# Patient Record
Sex: MALE | Race: WHITE | NOT HISPANIC OR LATINO
[De-identification: names, ages, dates, MRNs, and addresses within clinical notes are randomized per-mention and may not be internally consistent; named-entity substitution may affect disease eponyms.]

---

## 2018-02-21 PROBLEM — Z00.00 ENCOUNTER FOR PREVENTIVE HEALTH EXAMINATION: Status: ACTIVE | Noted: 2018-02-21

## 2018-02-26 ENCOUNTER — APPOINTMENT (OUTPATIENT)
Dept: ORTHOPEDIC SURGERY | Facility: CLINIC | Age: 58
End: 2018-02-26
Payer: COMMERCIAL

## 2018-02-26 DIAGNOSIS — M17.12 UNILATERAL PRIMARY OSTEOARTHRITIS, LEFT KNEE: ICD-10-CM

## 2018-02-26 DIAGNOSIS — Z60.2 PROBLEMS RELATED TO LIVING ALONE: ICD-10-CM

## 2018-02-26 DIAGNOSIS — M25.562 PAIN IN LEFT KNEE: ICD-10-CM

## 2018-02-26 PROCEDURE — 73560 X-RAY EXAM OF KNEE 1 OR 2: CPT | Mod: RT

## 2018-02-26 PROCEDURE — 73564 X-RAY EXAM KNEE 4 OR MORE: CPT | Mod: LT

## 2018-02-26 PROCEDURE — 99203 OFFICE O/P NEW LOW 30 MIN: CPT

## 2018-02-26 SDOH — SOCIAL STABILITY - SOCIAL INSECURITY: PROBLEMS RELATED TO LIVING ALONE: Z60.2

## 2019-10-02 PROBLEM — Z60.2 PERSON LIVING ALONE: Status: ACTIVE | Noted: 2018-02-26

## 2022-11-03 ENCOUNTER — APPOINTMENT (OUTPATIENT)
Dept: ORTHOPEDIC SURGERY | Facility: CLINIC | Age: 62
End: 2022-11-03

## 2022-11-03 VITALS — BODY MASS INDEX: 28.63 KG/M2 | WEIGHT: 200 LBS | HEIGHT: 70 IN

## 2022-11-03 DIAGNOSIS — M17.12 UNILATERAL PRIMARY OSTEOARTHRITIS, LEFT KNEE: ICD-10-CM

## 2022-11-03 DIAGNOSIS — M17.11 UNILATERAL PRIMARY OSTEOARTHRITIS, RIGHT KNEE: ICD-10-CM

## 2022-11-03 DIAGNOSIS — M10.9 GOUT, UNSPECIFIED: ICD-10-CM

## 2022-11-03 PROCEDURE — 73564 X-RAY EXAM KNEE 4 OR MORE: CPT | Mod: LT

## 2022-11-03 PROCEDURE — 99215 OFFICE O/P EST HI 40 MIN: CPT

## 2022-11-03 NOTE — ASSESSMENT
[FreeTextEntry1] : 62 year M WITH MODERATE LT KNEE PAIN WITHOUT INJURY. HAS TRIED GEL AND CSI IN THE PAST WITH NO RELIEF. PAIN WORSENS WITH STAIRS AND WALKING PROLONGED DISTANCES. PAIN IS AFFECTING ADL AND FUNCTIONAL ACTIVITIES. XRAYS REVIEWED WITH ADVANCED MEDIAL OA. TREATMENT OPTIONS REVIEWED. TKA DISCUSSED AT LENGTH. \par \par PMHx: GOUT\par NO METAL ALLERGIES\par NO H/O DM\par NO H/O DVT/PE\par NO H/O MRSA/VRSA \par \par \par LT TKA - \par \par DISCUSSED R&B OF TKA. WILL ORDER CT TO EVAL FOR BONE LOSS AND DEFORMITY FOR PRE-OP PLANNING. \par \par The patient was advised of the diagnosis. The natural history of the pathology was  explained in full to the patient in layman's terms. All questions were answered. The risks\par and benefits of surgical and non-surgical treatment alternatives were explained in full the patient. \par \par We discussed my findings and the natural history of their condition. We talked about the details of the proposed surgery and the recovery. We discussed the material risks, possible benefits and alternatives to surgery. The risks include but are not limited to infection, bleeding and possible need for blood transfusion, fracture, bowel blockage, bladder retention or infection, need for reoperation, stiffness and/or limited range of motion, possible damage to nerves and blood vessels, failure of fixation of components, risk of deep vein thromboses and pulmonary embolism, wound healing problems, dislocation, and possible leg length discrepancy. Although incredibly rare, we also discussed the risks of a cardiac event, stroke and even death during, or following, the surgery. We discussed the type of implants the patient will be receiving and the type of fixation that will be used, as well as whether a robot or computer navigation aide will be used. The patient understands they will need medical clearance and will attend a preoperative joint education class. We also discussed the type of anesthesia they will receive, and the risks associated with hospital or rehab length of stay, obesity, diabetes and smoking.\par \par Patient Complains of pain in the affected joint with a level that often reaches greater than a 8/10. The Pain has been progressively worsening of his/her treatment coarse. The pain has interfered with their ADLs and worsens with weight bearing. On exam they often have episodes of swelling/effusion with limited ROM. Pain worsens with ROM passive and active and I can palpate crepitus. \par X- rays were reviewed with the patient and they show joint space narrowing, subchondral sclerosis, osteophyte formation, and subchondral cysts.\par After a period of more than 12 weeks physical therapy or exercise program done with me or another treating physician they have continued pain. The patient has failed a trial of NSAID medication or pain relieves if they were unable to tolerate NSAID medications as well as a series of injection, steroid or Hyaluronic Acid. After a long discussion with the patient both the patient and I have decided we have exhausted all forms of less radical treatments and they would like to proceed with Total Joint Replacement.

## 2022-11-03 NOTE — HISTORY OF PRESENT ILLNESS
[Gradual] : gradual [9] : 9 [5] : 5 [Localized] : localized [Constant] : constant [Household chores] : household chores [Nothing helps with pain getting better] : Nothing helps with pain getting better [Stairs] : stairs [de-identified] : 11/03/22 pt presents here today with left knee pain for the past 3 months\par no injury\par pt has try gel and cortisone injections in the past with no relief By Dr Brooke  [] : no [FreeTextEntry1] : left knee  [FreeTextEntry5] : no injury  [de-identified] : pulling back [de-identified] : Dr Lombardi [de-identified] : nothing

## 2022-11-03 NOTE — PHYSICAL EXAM
[Left] : left knee [NL (140)] : flexion 140 degrees [NL (0)] : extension 0 degrees [] : patient ambulates without assistive device

## 2022-11-09 ENCOUNTER — FORM ENCOUNTER (OUTPATIENT)
Age: 62
End: 2022-11-09

## 2022-11-10 ENCOUNTER — APPOINTMENT (OUTPATIENT)
Dept: CT IMAGING | Facility: CLINIC | Age: 62
End: 2022-11-10

## 2022-11-10 PROCEDURE — 73700 CT LOWER EXTREMITY W/O DYE: CPT | Mod: LT

## 2022-11-10 PROCEDURE — 76376 3D RENDER W/INTRP POSTPROCES: CPT | Mod: LT

## 2023-01-09 ENCOUNTER — OUTPATIENT (OUTPATIENT)
Dept: OUTPATIENT SERVICES | Facility: HOSPITAL | Age: 63
LOS: 1 days | End: 2023-01-09
Payer: COMMERCIAL

## 2023-01-09 VITALS
HEIGHT: 70 IN | WEIGHT: 199.08 LBS | OXYGEN SATURATION: 96 % | TEMPERATURE: 97 F | RESPIRATION RATE: 12 BRPM | HEART RATE: 63 BPM | DIASTOLIC BLOOD PRESSURE: 80 MMHG | SYSTOLIC BLOOD PRESSURE: 168 MMHG

## 2023-01-09 DIAGNOSIS — Z01.818 ENCOUNTER FOR OTHER PREPROCEDURAL EXAMINATION: ICD-10-CM

## 2023-01-09 DIAGNOSIS — Z98.890 OTHER SPECIFIED POSTPROCEDURAL STATES: Chronic | ICD-10-CM

## 2023-01-09 DIAGNOSIS — M17.12 UNILATERAL PRIMARY OSTEOARTHRITIS, LEFT KNEE: ICD-10-CM

## 2023-01-09 LAB
A1C WITH ESTIMATED AVERAGE GLUCOSE RESULT: 5.3 % — SIGNIFICANT CHANGE UP (ref 4–5.6)
ALBUMIN SERPL ELPH-MCNC: 4.5 G/DL — SIGNIFICANT CHANGE UP (ref 3.3–5)
ALP SERPL-CCNC: 73 U/L — SIGNIFICANT CHANGE UP (ref 30–120)
ALT FLD-CCNC: 12 U/L DA — SIGNIFICANT CHANGE UP (ref 10–60)
ANION GAP SERPL CALC-SCNC: 6 MMOL/L — SIGNIFICANT CHANGE UP (ref 5–17)
APTT BLD: 33.6 SEC — SIGNIFICANT CHANGE UP (ref 27.5–35.5)
AST SERPL-CCNC: 20 U/L — SIGNIFICANT CHANGE UP (ref 10–40)
BILIRUB SERPL-MCNC: 0.8 MG/DL — SIGNIFICANT CHANGE UP (ref 0.2–1.2)
BLD GP AB SCN SERPL QL: SIGNIFICANT CHANGE UP
BUN SERPL-MCNC: 22 MG/DL — SIGNIFICANT CHANGE UP (ref 7–23)
CALCIUM SERPL-MCNC: 8.8 MG/DL — SIGNIFICANT CHANGE UP (ref 8.4–10.5)
CHLORIDE SERPL-SCNC: 104 MMOL/L — SIGNIFICANT CHANGE UP (ref 96–108)
CO2 SERPL-SCNC: 29 MMOL/L — SIGNIFICANT CHANGE UP (ref 22–31)
CREAT SERPL-MCNC: 1.1 MG/DL — SIGNIFICANT CHANGE UP (ref 0.5–1.3)
EGFR: 75 ML/MIN/1.73M2 — SIGNIFICANT CHANGE UP
ESTIMATED AVERAGE GLUCOSE: 105 MG/DL — SIGNIFICANT CHANGE UP (ref 68–114)
GLUCOSE SERPL-MCNC: 101 MG/DL — HIGH (ref 70–99)
HCT VFR BLD CALC: 40.9 % — SIGNIFICANT CHANGE UP (ref 39–50)
HGB BLD-MCNC: 14 G/DL — SIGNIFICANT CHANGE UP (ref 13–17)
INR BLD: 1.08 RATIO — SIGNIFICANT CHANGE UP (ref 0.88–1.16)
MCHC RBC-ENTMCNC: 29.7 PG — SIGNIFICANT CHANGE UP (ref 27–34)
MCHC RBC-ENTMCNC: 34.2 GM/DL — SIGNIFICANT CHANGE UP (ref 32–36)
MCV RBC AUTO: 86.7 FL — SIGNIFICANT CHANGE UP (ref 80–100)
MRSA PCR RESULT.: SIGNIFICANT CHANGE UP
NRBC # BLD: 0 /100 WBCS — SIGNIFICANT CHANGE UP (ref 0–0)
PLATELET # BLD AUTO: 213 K/UL — SIGNIFICANT CHANGE UP (ref 150–400)
POTASSIUM SERPL-MCNC: 4.2 MMOL/L — SIGNIFICANT CHANGE UP (ref 3.5–5.3)
POTASSIUM SERPL-SCNC: 4.2 MMOL/L — SIGNIFICANT CHANGE UP (ref 3.5–5.3)
PROT SERPL-MCNC: 8.3 G/DL — SIGNIFICANT CHANGE UP (ref 6–8.3)
PROTHROM AB SERPL-ACNC: 12.4 SEC — SIGNIFICANT CHANGE UP (ref 10.5–13.4)
RBC # BLD: 4.72 M/UL — SIGNIFICANT CHANGE UP (ref 4.2–5.8)
RBC # FLD: 12.8 % — SIGNIFICANT CHANGE UP (ref 10.3–14.5)
S AUREUS DNA NOSE QL NAA+PROBE: SIGNIFICANT CHANGE UP
SODIUM SERPL-SCNC: 139 MMOL/L — SIGNIFICANT CHANGE UP (ref 135–145)
WBC # BLD: 5.25 K/UL — SIGNIFICANT CHANGE UP (ref 3.8–10.5)
WBC # FLD AUTO: 5.25 K/UL — SIGNIFICANT CHANGE UP (ref 3.8–10.5)

## 2023-01-09 PROCEDURE — G0463: CPT

## 2023-01-09 PROCEDURE — 93005 ELECTROCARDIOGRAM TRACING: CPT

## 2023-01-09 PROCEDURE — 93010 ELECTROCARDIOGRAM REPORT: CPT

## 2023-01-09 RX ORDER — ALLOPURINOL 300 MG
0 TABLET ORAL
Qty: 0 | Refills: 0 | DISCHARGE

## 2023-01-09 NOTE — H&P PST ADULT - HISTORY OF PRESENT ILLNESS
63 yo male reports left knee pain for last year which is exacerbated by weight bearing and stair climbing rating 9/10 at worst.   He has tried HA and cortisone injections without relief.   He is scheduled for left total knee replacement on 1/27/2023 @ [facility].

## 2023-01-09 NOTE — H&P PST ADULT - BLOOD TRANSFUSION, PREVIOUS, PROFILE
Discharge planning      Discharge order noted for today. Patient has been accepted to ARU. Confirmed with [facility] that bed is available today. Met with patient and son and are agreeable to the transition plan today. Transport to facility has been arranged with staff at 1pm. Patient's discharge summary has been forwarded to ARU via [employer]. Bedside RN, Jax Rodriguez, has been updated to the transition plan. Discharge information has been updated on the AVS.  Please call report to [phone] to room 189.      K. Valene Severance, BSN, RN  Pager # [phone]  Care Manager no

## 2023-01-09 NOTE — H&P PST ADULT - PROBLEM SELECTOR PLAN 1
Left total knee replacement is planned for 1/27/2023  Covid testing scheduled for 1/25/2023 @ [facility]  Diagnostic testing performed  Medical and cardiac clearances requested  Pre op instructions were reviewed including chlorhexadine, gatorade and mupirocin  Best wishes offered Left total knee replacement is planned for 1/27/2023  Covid testing scheduled for 1/25/2023 @ [facility]  Diagnostic testing performed  Medical clearance requested  Pre op instructions were reviewed including chlorhexadine, gatorade and mupirocin  Best wishes offered

## 2023-01-09 NOTE — H&P PST ADULT - NSANTHOSAYNRD_GEN_A_CORE
MAR
No. VALENTINA screening performed.  STOP BANG Legend: 0-2 = LOW Risk; 3-4 = INTERMEDIATE Risk; 5-8 = HIGH Risk

## 2023-01-09 NOTE — H&P PST ADULT - NSICDXPASTMEDICALHX_GEN_ALL_CORE_FT
PAST MEDICAL HISTORY:  COVID-19 vaccine series completed     Osteoarthritis of left knee      PAST MEDICAL HISTORY:  COVID-19 vaccine series completed     Gout     Osteoarthritis of left knee

## 2023-01-09 NOTE — H&P PST ADULT - MUSCULOSKELETAL
details… no joint swelling/no joint erythema/no joint warmth/no calf tenderness/decreased ROM due to pain/extremities exam

## 2023-01-25 ENCOUNTER — OUTPATIENT (OUTPATIENT)
Dept: OUTPATIENT SERVICES | Facility: HOSPITAL | Age: 63
LOS: 1 days | End: 2023-01-25
Payer: COMMERCIAL

## 2023-01-25 DIAGNOSIS — M17.12 UNILATERAL PRIMARY OSTEOARTHRITIS, LEFT KNEE: ICD-10-CM

## 2023-01-25 DIAGNOSIS — Z01.818 ENCOUNTER FOR OTHER PREPROCEDURAL EXAMINATION: ICD-10-CM

## 2023-01-25 DIAGNOSIS — Z20.828 CONTACT WITH AND (SUSPECTED) EXPOSURE TO OTHER VIRAL COMMUNICABLE DISEASES: ICD-10-CM

## 2023-01-25 DIAGNOSIS — Z98.890 OTHER SPECIFIED POSTPROCEDURAL STATES: Chronic | ICD-10-CM

## 2023-01-25 LAB — SARS-COV-2 RNA SPEC QL NAA+PROBE: SIGNIFICANT CHANGE UP

## 2023-01-25 PROCEDURE — U0003: CPT

## 2023-01-25 PROCEDURE — U0005: CPT

## 2023-01-25 RX ORDER — CELECOXIB 200 MG/1
200 CAPSULE ORAL EVERY 12 HOURS
Refills: 0 | Status: DISCONTINUED | OUTPATIENT
Start: 2023-01-27 | End: 2023-02-10

## 2023-01-25 RX ORDER — OXYCODONE HYDROCHLORIDE 5 MG/1
10 TABLET ORAL
Refills: 0 | Status: DISCONTINUED | OUTPATIENT
Start: 2023-01-27 | End: 2023-01-27

## 2023-01-25 RX ORDER — ACETAMINOPHEN 500 MG
1000 TABLET ORAL EVERY 8 HOURS
Refills: 0 | Status: DISCONTINUED | OUTPATIENT
Start: 2023-01-27 | End: 2023-02-10

## 2023-01-25 RX ORDER — MAGNESIUM HYDROXIDE 400 MG/1
30 TABLET, CHEWABLE ORAL DAILY
Refills: 0 | Status: DISCONTINUED | OUTPATIENT
Start: 2023-01-27 | End: 2023-02-10

## 2023-01-25 RX ORDER — PANTOPRAZOLE SODIUM 20 MG/1
40 TABLET, DELAYED RELEASE ORAL
Refills: 0 | Status: DISCONTINUED | OUTPATIENT
Start: 2023-01-27 | End: 2023-02-10

## 2023-01-25 RX ORDER — SODIUM CHLORIDE 9 MG/ML
1000 INJECTION, SOLUTION INTRAVENOUS
Refills: 0 | Status: DISCONTINUED | OUTPATIENT
Start: 2023-01-27 | End: 2023-02-10

## 2023-01-25 RX ORDER — POLYETHYLENE GLYCOL 3350 17 G/17G
17 POWDER, FOR SOLUTION ORAL AT BEDTIME
Refills: 0 | Status: DISCONTINUED | OUTPATIENT
Start: 2023-01-27 | End: 2023-02-10

## 2023-01-25 RX ORDER — ONDANSETRON 8 MG/1
4 TABLET, FILM COATED ORAL EVERY 6 HOURS
Refills: 0 | Status: DISCONTINUED | OUTPATIENT
Start: 2023-01-27 | End: 2023-02-10

## 2023-01-25 RX ORDER — HYDROMORPHONE HYDROCHLORIDE 2 MG/ML
0.5 INJECTION INTRAMUSCULAR; INTRAVENOUS; SUBCUTANEOUS
Refills: 0 | Status: DISCONTINUED | OUTPATIENT
Start: 2023-01-27 | End: 2023-01-27

## 2023-01-25 RX ORDER — SENNA PLUS 8.6 MG/1
2 TABLET ORAL AT BEDTIME
Refills: 0 | Status: DISCONTINUED | OUTPATIENT
Start: 2023-01-27 | End: 2023-02-10

## 2023-01-25 RX ORDER — OXYCODONE HYDROCHLORIDE 5 MG/1
5 TABLET ORAL
Refills: 0 | Status: DISCONTINUED | OUTPATIENT
Start: 2023-01-27 | End: 2023-01-28

## 2023-01-26 ENCOUNTER — TRANSCRIPTION ENCOUNTER (OUTPATIENT)
Age: 63
End: 2023-01-26

## 2023-01-26 NOTE — ASU PATIENT PROFILE, ADULT - NSICDXPASTMEDICALHX_GEN_ALL_CORE_FT
PAST MEDICAL HISTORY:  COVID-19 vaccine series completed     Gout     Osteoarthritis of left knee

## 2023-01-27 ENCOUNTER — OUTPATIENT (OUTPATIENT)
Dept: OUTPATIENT SERVICES | Facility: HOSPITAL | Age: 63
LOS: 1 days | End: 2023-01-27
Payer: COMMERCIAL

## 2023-01-27 ENCOUNTER — TRANSCRIPTION ENCOUNTER (OUTPATIENT)
Age: 63
End: 2023-01-27

## 2023-01-27 ENCOUNTER — RESULT REVIEW (OUTPATIENT)
Age: 63
End: 2023-01-27

## 2023-01-27 ENCOUNTER — APPOINTMENT (OUTPATIENT)
Dept: ORTHOPEDIC SURGERY | Facility: HOSPITAL | Age: 63
End: 2023-01-27
Payer: COMMERCIAL

## 2023-01-27 VITALS
RESPIRATION RATE: 16 BRPM | SYSTOLIC BLOOD PRESSURE: 164 MMHG | HEIGHT: 70 IN | HEART RATE: 67 BPM | DIASTOLIC BLOOD PRESSURE: 94 MMHG | OXYGEN SATURATION: 100 % | WEIGHT: 202.83 LBS | TEMPERATURE: 98 F

## 2023-01-27 DIAGNOSIS — Z98.890 OTHER SPECIFIED POSTPROCEDURAL STATES: Chronic | ICD-10-CM

## 2023-01-27 DIAGNOSIS — Z01.818 ENCOUNTER FOR OTHER PREPROCEDURAL EXAMINATION: ICD-10-CM

## 2023-01-27 DIAGNOSIS — M17.12 UNILATERAL PRIMARY OSTEOARTHRITIS, LEFT KNEE: ICD-10-CM

## 2023-01-27 LAB
ABO RH CONFIRMATION: SIGNIFICANT CHANGE UP
ANION GAP SERPL CALC-SCNC: 9 MMOL/L — SIGNIFICANT CHANGE UP (ref 5–17)
BUN SERPL-MCNC: 19 MG/DL — SIGNIFICANT CHANGE UP (ref 7–23)
CALCIUM SERPL-MCNC: 9 MG/DL — SIGNIFICANT CHANGE UP (ref 8.4–10.5)
CHLORIDE SERPL-SCNC: 103 MMOL/L — SIGNIFICANT CHANGE UP (ref 96–108)
CO2 SERPL-SCNC: 26 MMOL/L — SIGNIFICANT CHANGE UP (ref 22–31)
CREAT SERPL-MCNC: 1.23 MG/DL — SIGNIFICANT CHANGE UP (ref 0.5–1.3)
EGFR: 66 ML/MIN/1.73M2 — SIGNIFICANT CHANGE UP
GLUCOSE SERPL-MCNC: 145 MG/DL — HIGH (ref 70–99)
HCT VFR BLD CALC: 38.3 % — LOW (ref 39–50)
HGB BLD-MCNC: 13.1 G/DL — SIGNIFICANT CHANGE UP (ref 13–17)
MCHC RBC-ENTMCNC: 29.2 PG — SIGNIFICANT CHANGE UP (ref 27–34)
MCHC RBC-ENTMCNC: 34.2 GM/DL — SIGNIFICANT CHANGE UP (ref 32–36)
MCV RBC AUTO: 85.5 FL — SIGNIFICANT CHANGE UP (ref 80–100)
NRBC # BLD: 0 /100 WBCS — SIGNIFICANT CHANGE UP (ref 0–0)
PLATELET # BLD AUTO: 197 K/UL — SIGNIFICANT CHANGE UP (ref 150–400)
POTASSIUM SERPL-MCNC: 4.2 MMOL/L — SIGNIFICANT CHANGE UP (ref 3.5–5.3)
POTASSIUM SERPL-SCNC: 4.2 MMOL/L — SIGNIFICANT CHANGE UP (ref 3.5–5.3)
RBC # BLD: 4.48 M/UL — SIGNIFICANT CHANGE UP (ref 4.2–5.8)
RBC # FLD: 12.6 % — SIGNIFICANT CHANGE UP (ref 10.3–14.5)
SODIUM SERPL-SCNC: 138 MMOL/L — SIGNIFICANT CHANGE UP (ref 135–145)
WBC # BLD: 10.91 K/UL — HIGH (ref 3.8–10.5)
WBC # FLD AUTO: 10.91 K/UL — HIGH (ref 3.8–10.5)

## 2023-01-27 PROCEDURE — 99204 OFFICE O/P NEW MOD 45 MIN: CPT

## 2023-01-27 PROCEDURE — 73560 X-RAY EXAM OF KNEE 1 OR 2: CPT | Mod: 26,LT

## 2023-01-27 PROCEDURE — 27447 TOTAL KNEE ARTHROPLASTY: CPT | Mod: LT

## 2023-01-27 DEVICE — PIN TROCAR GEN 1/8 X 3IN: Type: IMPLANTABLE DEVICE | Site: LEFT | Status: FUNCTIONAL

## 2023-01-27 DEVICE — CEMENT SIMPLEX P 40GM: Type: IMPLANTABLE DEVICE | Site: LEFT | Status: FUNCTIONAL

## 2023-01-27 DEVICE — KIT INSERT TOTAL IDENTITY CR: Type: IMPLANTABLE DEVICE | Site: LEFT | Status: FUNCTIONAL

## 2023-01-27 RX ORDER — CELECOXIB 200 MG/1
1 CAPSULE ORAL
Qty: 56 | Refills: 0
Start: 2023-01-27 | End: 2023-02-23

## 2023-01-27 RX ORDER — ALLOPURINOL 300 MG
200 TABLET ORAL
Qty: 0 | Refills: 0 | DISCHARGE

## 2023-01-27 RX ORDER — TRANEXAMIC ACID 100 MG/ML
1000 INJECTION, SOLUTION INTRAVENOUS ONCE
Refills: 0 | Status: COMPLETED | OUTPATIENT
Start: 2023-01-27 | End: 2023-01-27

## 2023-01-27 RX ORDER — DEXAMETHASONE 0.5 MG/5ML
8 ELIXIR ORAL ONCE
Refills: 0 | Status: COMPLETED | OUTPATIENT
Start: 2023-01-28 | End: 2023-01-28

## 2023-01-27 RX ORDER — CHLORHEXIDINE GLUCONATE 213 G/1000ML
1 SOLUTION TOPICAL ONCE
Refills: 0 | Status: COMPLETED | OUTPATIENT
Start: 2023-01-27 | End: 2023-01-27

## 2023-01-27 RX ORDER — ONDANSETRON 8 MG/1
4 TABLET, FILM COATED ORAL ONCE
Refills: 0 | Status: DISCONTINUED | OUTPATIENT
Start: 2023-01-27 | End: 2023-01-27

## 2023-01-27 RX ORDER — ALLOPURINOL 300 MG
200 TABLET ORAL DAILY
Refills: 0 | Status: DISCONTINUED | OUTPATIENT
Start: 2023-01-27 | End: 2023-01-27

## 2023-01-27 RX ORDER — CEFAZOLIN SODIUM 1 G
2000 VIAL (EA) INJECTION EVERY 8 HOURS
Refills: 0 | Status: COMPLETED | OUTPATIENT
Start: 2023-01-27 | End: 2023-01-28

## 2023-01-27 RX ORDER — ACETAMINOPHEN 500 MG
1000 TABLET ORAL ONCE
Refills: 0 | Status: COMPLETED | OUTPATIENT
Start: 2023-01-27 | End: 2023-01-27

## 2023-01-27 RX ORDER — HYDROMORPHONE HYDROCHLORIDE 2 MG/ML
0.5 INJECTION INTRAMUSCULAR; INTRAVENOUS; SUBCUTANEOUS
Refills: 0 | Status: DISCONTINUED | OUTPATIENT
Start: 2023-01-27 | End: 2023-01-27

## 2023-01-27 RX ORDER — ASPIRIN/CALCIUM CARB/MAGNESIUM 324 MG
1 TABLET ORAL
Qty: 56 | Refills: 0
Start: 2023-01-27 | End: 2023-02-23

## 2023-01-27 RX ORDER — APREPITANT 80 MG/1
40 CAPSULE ORAL ONCE
Refills: 0 | Status: COMPLETED | OUTPATIENT
Start: 2023-01-27 | End: 2023-01-27

## 2023-01-27 RX ORDER — OMEPRAZOLE 10 MG/1
1 CAPSULE, DELAYED RELEASE ORAL
Qty: 28 | Refills: 0
Start: 2023-01-27 | End: 2023-02-23

## 2023-01-27 RX ORDER — SODIUM CHLORIDE 9 MG/ML
1000 INJECTION, SOLUTION INTRAVENOUS
Refills: 0 | Status: DISCONTINUED | OUTPATIENT
Start: 2023-01-27 | End: 2023-01-27

## 2023-01-27 RX ORDER — ACETAMINOPHEN 500 MG
2 TABLET ORAL
Qty: 0 | Refills: 0 | DISCHARGE
Start: 2023-01-27 | End: 2023-02-10

## 2023-01-27 RX ORDER — ALLOPURINOL 300 MG
200 TABLET ORAL DAILY
Refills: 0 | Status: DISCONTINUED | OUTPATIENT
Start: 2023-01-27 | End: 2023-02-10

## 2023-01-27 RX ORDER — ASPIRIN/CALCIUM CARB/MAGNESIUM 324 MG
81 TABLET ORAL EVERY 12 HOURS
Refills: 0 | Status: DISCONTINUED | OUTPATIENT
Start: 2023-01-28 | End: 2023-02-10

## 2023-01-27 RX ORDER — CEFAZOLIN SODIUM 1 G
2000 VIAL (EA) INJECTION ONCE
Refills: 0 | Status: COMPLETED | OUTPATIENT
Start: 2023-01-27 | End: 2023-01-27

## 2023-01-27 RX ORDER — POLYETHYLENE GLYCOL 3350 17 G/17G
17 POWDER, FOR SOLUTION ORAL
Qty: 0 | Refills: 0 | DISCHARGE
Start: 2023-01-27

## 2023-01-27 RX ORDER — SENNA PLUS 8.6 MG/1
2 TABLET ORAL
Qty: 0 | Refills: 0 | DISCHARGE
Start: 2023-01-27

## 2023-01-27 RX ADMIN — Medication 1000 MILLIGRAM(S): at 21:25

## 2023-01-27 RX ADMIN — OXYCODONE HYDROCHLORIDE 5 MILLIGRAM(S): 5 TABLET ORAL at 21:15

## 2023-01-27 RX ADMIN — Medication 1000 MILLIGRAM(S): at 17:07

## 2023-01-27 RX ADMIN — Medication 1000 MILLIGRAM(S): at 21:31

## 2023-01-27 RX ADMIN — CELECOXIB 200 MILLIGRAM(S): 200 CAPSULE ORAL at 21:25

## 2023-01-27 RX ADMIN — CHLORHEXIDINE GLUCONATE 1 APPLICATION(S): 213 SOLUTION TOPICAL at 07:15

## 2023-01-27 RX ADMIN — SODIUM CHLORIDE 75 MILLILITER(S): 9 INJECTION, SOLUTION INTRAVENOUS at 12:32

## 2023-01-27 RX ADMIN — APREPITANT 40 MILLIGRAM(S): 80 CAPSULE ORAL at 07:32

## 2023-01-27 RX ADMIN — OXYCODONE HYDROCHLORIDE 5 MILLIGRAM(S): 5 TABLET ORAL at 20:23

## 2023-01-27 RX ADMIN — CELECOXIB 200 MILLIGRAM(S): 200 CAPSULE ORAL at 21:31

## 2023-01-27 RX ADMIN — Medication 400 MILLIGRAM(S): at 17:07

## 2023-01-27 RX ADMIN — Medication 100 MILLIGRAM(S): at 17:42

## 2023-01-27 NOTE — PHYSICAL THERAPY INITIAL EVALUATION ADULT - GAIT TRAINING, PT EVAL
Patient will ambulate 150 feet independently with a rolling walker within 1-2 days for safe community ambulation.

## 2023-01-27 NOTE — DISCHARGE NOTE NURSING/CASE MANAGEMENT/SOCIAL WORK - PATIENT PORTAL LINK FT
You can access the FollowMyHealth Patient Portal offered by [facility] by registering at the following website: [email or web address]. By joining [facility]’s FollowMyHealth portal, you will also be able to view your health information using other applications (apps) compatible with our system.

## 2023-01-27 NOTE — CARE COORDINATION ASSESSMENT. - NSPASTMEDSURGHISTORY_GEN_ALL_CORE_FT
PAST MEDICAL & SURGICAL HISTORY:  Gout      Osteoarthritis of left knee      COVID-19 vaccine series completed      History of arthroscopy of right shoulder

## 2023-01-27 NOTE — DISCHARGE NOTE PROVIDER - NSDCFUSCHEDAPPT_GEN_ALL_CORE_FT
Mario Jacobo  [facility] Physician [facility]  [address]  Scheduled Appointment: 02/09/2023

## 2023-01-27 NOTE — BRIEF OPERATIVE NOTE - COMMENTS
Patient tolerated the procedure well and was transported to the PACU in stable condition.   XR visualized by surgeon. No FB, No FX, Prosthesis in good alignment with proper placement negative...

## 2023-01-27 NOTE — PATIENT CHOICE NOTE. - NSPTCHOICESTATE_GEN_ALL_CORE

## 2023-01-27 NOTE — DISCHARGE NOTE PROVIDER - HOSPITAL COURSE
This patient was admitted to [facility] with a history of severe degenerative joint disease of the left knee.  Patient underwent Pre-Surgical Testing and was medically cleared to undergo elective procedure. Patient underwent left TKR by Dr. Narendra Jacobo on 1/27/23. Procedure was well tolerated.  No operative or emanuel-operative complications arose during patient's hospital course.  Patient received antibiotic according to SCIP guidelines for infection prevention.  Aspirin 81mg q 12 h was given for DVT prophylaxis, in addition to the use of SCDs.  Anesthesia, Medical Hospitalist, Physical Therapy and Occupational Therapy were consulted. Patient is stable for discharge with a good prognosis.  Appropriate discharge instructions and medications are provided in this document.

## 2023-01-27 NOTE — DISCHARGE NOTE PROVIDER - NSDCQMCOGNITION_NEU_ALL_CORE
After Visit Summary   5/23/2018    Jean Cross    MRN: [id number]           Patient Information     Date Of Birth          1984        Visit Information        Provider Department      5/23/2018 7:00 AM Gadiel Gamez MD [facility]        Today's Diagnoses     Routine history and physical examination of adult    -  1    Essential hypertension with goal blood pressure less than 140/90        CARDIOVASCULAR SCREENING; LDL GOAL LESS THAN 160        Screening for diabetes mellitus        Screening for HIV (human immunodeficiency virus)        Right foot pain          Care Instructions      Preventive Health Recommendations  Male Ages 26 - 39    Yearly exam:             See your health care provider every year in order to  o   Review health changes.   o   Discuss preventive care.    o   Review your medicines if your doctor has prescribed any.    You should be tested each year for STDs (sexually transmitted diseases), if you re at risk.     After age 35, talk to your provider about cholesterol testing. If you are at risk for heart disease, have your cholesterol tested at least every 5 years.     If you are at risk for diabetes, you should have a diabetes test (fasting glucose).  Shots: Get a flu shot each year. Get a tetanus shot every 10 years.     Nutrition:    Eat at least 5 servings of fruits and vegetables daily.     Eat whole-grain bread, whole-wheat pasta and brown rice instead of white grains and rice.     Talk to your provider about Calcium and Vitamin D.     Lifestyle    Exercise for at least 150 minutes a week (30 minutes a day, 5 days a week). This will help you control your weight and prevent disease.     Limit alcohol to one drink per day.     No smoking.     Wear sunscreen to prevent skin cancer.     See your dentist every six months for an exam and cleaning.     At [facility], we strive to deliver an exceptional experience to you, every time we see  you.  If you receive a survey in the mail, please send us back your thoughts. We really do value your feedback.    Based on your medical history, these are the current health maintenance/preventive care services that you are due for (some may have been done at this visit.)  Health Maintenance Due   Topic Date Due     HIV SCREEN (SYSTEM ASSIGNED)  01/05/2001     LIPID MONITORING Q1 YEAR  11/20/2016     ISRAEL QUESTIONNAIRE 1 YEAR  07/15/2017     PHQ-9 Q1YR  07/15/2017     BMP Q1 YR  02/28/2018         Suggested websites for health information:  Www.Simple.org : Up to date and easily searchable information on multiple topics.  Www.AcceloWeb.gov : medication info, interactive tutorials, watch real surgeries online  Www.familydoctor.org : good info from the Academy of Family Physicians  Www.cdc.gov : public health info, travel advisories, epidemics (H1N1)  Www.aap.org : children's health info, normal development, vaccinations  Www.health.[facility].[place].us : [place] dept of health, public health issues in [place], N1N1    Your care team:                            Family Medicine Internal Medicine   MD Duglas Gresham MD Shantel Branch-Fleming, MD Katya Georgiev PA-C Nam Ho, MD Pediatrics   YASMEEN Rojas, KATTY Alex APRN CNP   MD Adelaide Paredes MD Deborah Mielke, MD Kim Thein, APRN CNP      Clinic hours: Monday - Thursday 7 am-7 pm; Fridays 7 am-5 pm.   Urgent care: Monday - Friday 11 am-9 pm; Saturday and Sunday 9 am-5 pm.  Pharmacy : Monday -Thursday 8 am-8 pm; Friday 8 am-6 pm; Saturday and Sunday 9 am-5 pm.     Clinic: [phone]   Pharmacy: [phone]            Follow-ups after your visit        Who to contact     If you have questions or need follow up information about today's clinic visit or your schedule please contact [facility] [address] directly at [phone].  Normal or non-critical lab and imaging results will be communicated to  "you by MyChart, letter or phone within 4 business days after the clinic has received the results. If you do not hear from us within 7 days, please contact the clinic through [employer] or phone. If you have a critical or abnormal lab result, we will notify you by phone as soon as possible.  Submit refill requests through [employer] or call your pharmacy and they will forward the refill request to us. Please allow 3 business days for your refill to be completed.          Additional Information About Your Visit        [facility] Information     [employer] It First gives you secure access to your electronic health record. If you see a primary care provider, you can also send messages to your care team and make appointments. If you have questions, please call your primary care clinic.  If you do not have a primary care provider, please call [phone] and they will assist you.        Care EveryWhere ID     This is your Care EveryWhere ID. This could be used by other organizations to access your [facility] medical records  [id number]        Your Vitals Were     Pulse Temperature Respirations Height Pulse Oximetry BMI (Body Mass Index)    83 98.6  F (37  C) (Oral) 16 5' 10\" (1.778 m) 96% 34.15 kg/m2       Blood Pressure from Last 3 Encounters:   05/23/18 122/81   06/19/17 126/80   03/30/17 125/79    Weight from Last 3 Encounters:   05/23/18 238 lb (108 kg)   06/19/17 238 lb 14.4 oz (108.4 kg)   03/30/17 242 lb 1.6 oz (109.8 kg)              We Performed the Following     Albumin Random Urine Quantitative with Creat Ratio     Comprehensive metabolic panel (BMP + Alb, Alk Phos, ALT, AST, Total. Bili, TP)     HIV Screening     Lipid panel reflex to direct LDL Fasting          Today's Medication Changes          These changes are accurate as of 5/23/18  7:53 AM.  If you have any questions, ask your nurse or doctor.               Start taking these medicines.        Dose/Directions    losartan 25 MG tablet   Commonly known as:  COZAAR   Used " for:  Essential hypertension with goal blood pressure less than 140/90   Started by:  Gadiel Gamez MD        Dose:  25 mg   Take 1 tablet (25 mg) by mouth daily For blood pressure.   Quantity:  30 tablet   Refills:  1         Stop taking these medicines if you haven't already. Please contact your care team if you have questions.     lisinopril 10 MG tablet   Commonly known as:  PRINIVIL/ZESTRIL   Stopped by:  Gadiel Gamez MD                Where to get your medicines      These medications were sent to [facility] [address] [place] [id number]     Phone:  [phone]     losartan 25 MG tablet                Primary Care Provider Office Phone # Fax #    Gadiel Gamez [phone]       [address] [place]  [facility] [id number]        Equal Access to Services     [facility]: Hadii aad ku hadasho Soomaali, waaxda luqadaha, qaybta kaalmada adeegyada, naif casillasin hayaadarlene mckeon . So [facility] [phone].    ATENCIÓN: Si habla español, tiene a ny disposición servicios gratuitos de asistencia lingüística. Llame al [phone].    We comply with applicable federal civil rights laws and [place] laws. We do not discriminate on the basis of race, color, national origin, age, disability, sex, sexual orientation, or gender identity.            Thank you!     Thank you for choosing [facility]  for your care. Our goal is always to provide you with excellent care. Hearing back from our patients is one way we can continue to improve our services. Please take a few minutes to complete the written survey that you may receive in the mail after your visit with us. Thank you!             Your Updated Medication List - Protect others around you: Learn how to safely use, store and throw away your medicines at www.disposemymeds.org.          This list is accurate as of 5/23/18  7:53 AM.  Always use your most recent med list.                    Brand Name Dispense Instructions for use Diagnosis    losartan 25 MG tablet    COZAAR    30 tablet    Take 1 tablet (25 mg) by mouth daily For blood pressure.    Essential hypertension with goal blood pressure less than 140/90          No difficulties

## 2023-01-27 NOTE — CONSULT NOTE ADULT - ASSESSMENT
61 y/o f with left knee pain and OA , gout here for left knee pain    post op care left tkr  - pain ocntrol as per primary team  - tylenol prn for fever  - AC as per primary  - incentive spirometry  - pt/ot eval    gout  - home meds    dvt/gi ppx  please obtain base line labs

## 2023-01-27 NOTE — PHYSICAL THERAPY INITIAL EVALUATION ADULT - ADDITIONAL COMMENTS
Pt lives with girlfriend in an apartment that has elevator and ramp access. Pt has a tub shower. PTA pt was independent with ADLs and ambulation. Pt has a shower bench, RW, raised toilet seat and crutches.

## 2023-01-27 NOTE — DISCHARGE NOTE PROVIDER - NSDCMRMEDTOKEN_GEN_ALL_CORE_FT
acetaminophen 500 mg oral tablet: 2 tab(s) orally every 8 hours  allopurinol: 200 milligram(s) orally once a day  Aspirin Enteric Coated 81 mg oral delayed release tablet: 1 tab orally every 12 hours. Take aspirin 2 hours before Celebrex/Meloxicam.  CeleBREX 200 mg oral capsule: 1 cap orally every 12 hours. Take 2 hours after aspirin.  omeprazole 20 mg oral delayed release capsule: 1 cap(s) orally once a day    acetaminophen 500 mg oral tablet: 2 tab(s) orally every 8 hours  allopurinol: 200 milligram(s) orally once a day  Aspirin Enteric Coated 81 mg oral delayed release tablet: 1 tab orally every 12 hours. Take aspirin 2 hours before Celebrex/Meloxicam.  CeleBREX 200 mg oral capsule: 1 cap orally every 12 hours. Take 2 hours after aspirin.  omeprazole 20 mg oral delayed release capsule: 1 cap(s) orally once a day   oxyCODONE 5 mg oral tablet: 1-2 tab(s) orally every 4 hours, As Needed -Moderate to severe Pain MDD:6   [id number]  polyethylene glycol 3350 oral powder for reconstitution: 17 gram(s) orally once a day (at bedtime)  senna leaf extract oral tablet: 2 tab(s) orally once a day (at bedtime)

## 2023-01-27 NOTE — PHARMACOTHERAPY INTERVENTION NOTE - COMMENTS
Admission medication reconciliation POD0  
Transition of Care video discharge education - medication calendar given to patient

## 2023-01-27 NOTE — CONSULT NOTE ADULT - SUBJECTIVE AND OBJECTIVE BOX
Patient is a 62y old  Male who presents with a chief complaint of     HPI:      PAST MEDICAL & SURGICAL HISTORY:  COVID-19 vaccine series completed      Osteoarthritis of left knee      Gout      History of arthroscopy of right shoulder          FAMILY HISTORY:  No pertinent family history in first degree relatives        SOCIAL HISTORY:    Allergies    No Known Allergies    Intolerances          REVIEW OF SYSTEMS:  General: NAD  Skin/Breast: no rash  Ophthalmologic: no vision changes, no dry eyes   Respiratory and Thorax: no cough  Cardiovascular: no chest pain  Gastrointestinal: no n/v/d  : no urigency, no increased frequency  Musculoskeletal: no trauma, no sprain/strain, no myalgias, no arthralgias, no fracture  Neurological: no HA, no dizziness, no weakness, no numbness  Psychiatric: no depression, no SI/HI  Hematology/Lymphatics: no easy bruising  Endocrine: no heat or cold intolerance. no weight gain or loss  Allergic/Immunologic: no allergy or recent reaction       Vital Signs Last 24 Hrs  T(C): 36 (27 Jan 2023 10:55), Max: 36.7 (27 Jan 2023 07:06)  T(F): 96.8 (27 Jan 2023 10:55), Max: 98.1 (27 Jan 2023 07:06)  HR: 57 (27 Jan 2023 12:50) (51 - 67)  BP: 134/85 (27 Jan 2023 12:25) (110/69 - 164/94)  BP(mean): --  RR: 17 (27 Jan 2023 12:50) (13 - 17)  SpO2: 98% (27 Jan 2023 12:50) (98% - 100%)    Parameters below as of 27 Jan 2023 11:55  Patient On (Oxygen Delivery Method): nasal cannula  O2 Flow (L/min): 2    I&O's Summary      PHYSICAL EXAM:  GENERAL: NAD, Comfortable  HEAD:  Atraumatic, Normocephalic  NECK: Supple, No JVD  CHEST/LUNG: Clear to auscultation bilaterally; No wheeze  HEART: Regular rate and rhythm  ABDOMEN: Soft, Nontender, Nondistended; Bowel sounds present  Neuro: no focal deficit  EXTREMITIES:  no edema  SKIN: No rashes or lesions    LABS:            CAPILLARY BLOOD GLUCOSE                RADIOLOGY & ADDITIONAL TESTS:    Imaging Personally Reviewed:  [x] YES  [ ] NO    Consultant(s) Notes Reviewed:  [x] YES  [ ] NO      MEDICATIONS  (STANDING):  lactated ringers. 1000 milliLiter(s) (75 mL/Hr) IV Continuous <Continuous>    MEDICATIONS  (PRN):  HYDROmorphone  Injectable 0.5 milliGRAM(s) IV Push every 10 minutes PRN Moderate Pain (4 - 6)  ondansetron Injectable 4 milliGRAM(s) IV Push once PRN Nausea and/or Vomiting      Care Discussed with Consultants/Other Providers [x] YES  [ ] NO    HEALTH ISSUES - PROBLEM Dx:       Patient is a 62y old  Male who presents with a chief complaint of left knee pain and OA    HPI: 63 y/o f with left knee pain and OA , gout here for left knee pain and is s/p l tkr. no fever, np shob      PAST MEDICAL & SURGICAL HISTORY:  COVID-19 vaccine series completed      Osteoarthritis of left knee      Gout      History of arthroscopy of right shoulder          FAMILY HISTORY:  No pertinent family history in first degree relatives        SOCIAL HISTORY:    Allergies    No Known Allergies    Intolerances          REVIEW OF SYSTEMS:  General: NAD  Skin/Breast: no rash  Ophthalmologic: no vision changes, no dry eyes   Respiratory and Thorax: no cough  Cardiovascular: no chest pain  Gastrointestinal: no n/v/d  : no urigency, no increased frequency  Musculoskeletal: no trauma, no sprain/strain, no myalgias, no arthralgias, no fracture  Neurological: no HA, no dizziness, no weakness, no numbness  Psychiatric: no depression, no SI/HI  Hematology/Lymphatics: no easy bruising  Endocrine: no heat or cold intolerance. no weight gain or loss  Allergic/Immunologic: no allergy or recent reaction       Vital Signs Last 24 Hrs  T(C): 36 (27 Jan 2023 10:55), Max: 36.7 (27 Jan 2023 07:06)  T(F): 96.8 (27 Jan 2023 10:55), Max: 98.1 (27 Jan 2023 07:06)  HR: 57 (27 Jan 2023 12:50) (51 - 67)  BP: 134/85 (27 Jan 2023 12:25) (110/69 - 164/94)  BP(mean): --  RR: 17 (27 Jan 2023 12:50) (13 - 17)  SpO2: 98% (27 Jan 2023 12:50) (98% - 100%)    Parameters below as of 27 Jan 2023 11:55  Patient On (Oxygen Delivery Method): nasal cannula  O2 Flow (L/min): 2    I&O's Summary      PHYSICAL EXAM:  GENERAL: NAD, Comfortable  HEAD:  Atraumatic, Normocephalic  NECK: Supple, No JVD  CHEST/LUNG: Clear to auscultation bilaterally; No wheeze  HEART: Regular rate and rhythm  ABDOMEN: Soft, Nontender, Nondistended; Bowel sounds present  Neuro: no focal deficit  EXTREMITIES:  no edema  SKIN: No rashes or lesions    LABS:            CAPILLARY BLOOD GLUCOSE                RADIOLOGY & ADDITIONAL TESTS:    Imaging Personally Reviewed:  [x] YES  [ ] NO    Consultant(s) Notes Reviewed:  [x] YES  [ ] NO      MEDICATIONS  (STANDING):  lactated ringers. 1000 milliLiter(s) (75 mL/Hr) IV Continuous <Continuous>    MEDICATIONS  (PRN):  HYDROmorphone  Injectable 0.5 milliGRAM(s) IV Push every 10 minutes PRN Moderate Pain (4 - 6)  ondansetron Injectable 4 milliGRAM(s) IV Push once PRN Nausea and/or Vomiting      Care Discussed with Consultants/Other Providers [x] YES  [ ] NO    HEALTH ISSUES - PROBLEM Dx:

## 2023-01-27 NOTE — DISCHARGE NOTE NURSING/CASE MANAGEMENT/SOCIAL WORK - NSSCNAMETXT_GEN_ALL_CORE
[facility] (formerly [facility])   [address] [place] [address]

## 2023-01-27 NOTE — DISCHARGE NOTE PROVIDER - NSDCCPCAREPLAN_GEN_ALL_CORE_FT
PRINCIPAL DISCHARGE DIAGNOSIS  Diagnosis: Primary osteoarthritis of left knee  Assessment and Plan of Treatment: For your total knee replacement:  Physical Therapy/Occupational Therapy for: ambulation, transfers, stairs, ADL's (activities of daily living), range of motion exercises, and isometrics  -Activity  • Weight Bearing as tolerated with rolling walker.  • Cryo/cuff 20 minutes several times daily with at least a 1 hour break in between icing sessions  • Take short, frequent walks increasing the distance that you walk each day as tolerated.  • Change your position every hour to decrease pain and stiffness.  • Continue the exercises taught to you by your physical therapist.  • No driving until cleared by the doctor.  • No tub baths, hot tubs, or swimming pools until instructed by your doctor.  • Do not squat down on the floor.  • Do not kneel or twist your knee.  • Range of Motion Goals: Flexion= 120 degrees, Extension = 0 degrees  You have a RADHA dressing. You may shower. Disconnect RADHA battery prior to showering. Reconnect battery after showering and press orange button to resume RADHA power. Remove RADHA dressing on post-op day #7.  Keep incision clean. DO NOT APPLY ANYTHING to incision site (salves/ointments/creams). Do not scrub incision site. Pat dry after shower.         PRINCIPAL DISCHARGE DIAGNOSIS  Diagnosis: Primary osteoarthritis of left knee  Assessment and Plan of Treatment: For your total knee replacement:  Physical Therapy/Occupational Therapy for: ambulation, transfers, stairs, ADL's (activities of daily living), range of motion exercises, and isometrics  -Activity  • Weight Bearing as tolerated with rolling walker.  • Take short, frequent walks increasing the distance that you walk each day as tolerated.  • Change your position every hour to decrease pain and stiffness.  • Continue the exercises taught to you by your physical therapist.  • No driving until cleared by the doctor.  • No tub baths, hot tubs, or swimming pools until instructed by your doctor.  • Do not squat down on the floor.  • Do not kneel or twist your knee.  • Range of Motion Goals: Flexion= 120 degrees, Extension = 0 degrees  You have a RADHA dressing. You may shower. Disconnect RADHA battery prior to showering. Reconnect battery after showering and press orange button to resume RADHA power. Remove RADHA dressing on post-op day #7.  Keep incision clean. DO NOT APPLY ANYTHING to incision site (salves/ointments/creams). Do not scrub incision site. Pat dry after shower.  Use cryocuff for 20 minute sessions w/ at least 1 hr between sessions.  Use a towel or washcloth under cuff; don't place directly on the skin.  Opioid safety : Do not keep opioid in the medicine cabinet in bathroom where others may have access to it.  Do not drive while taking opioid.  To dispose of it some pharmacies do have drop boxes as do police stations.  Do not flush or put in trash.

## 2023-01-27 NOTE — DISCHARGE NOTE PROVIDER - CARE PROVIDER_API CALL
Mario Jacobo)  Orthopaedic Surgery  [address] [place] [address]  Phone: [phone]  Fax: [phone]  Established Patient  Scheduled Appointment: 02/09/2023 10:00 AM

## 2023-01-27 NOTE — CARE COORDINATION ASSESSMENT. - NSDCPLANSERVICES_GEN_ALL_CORE
significant other Jennifer will provide transport home  [facility] - SOC 01/29/2023/Home Care/Transportation

## 2023-01-27 NOTE — DISCHARGE NOTE NURSING/CASE MANAGEMENT/SOCIAL WORK - NSDCPEFALRISK_GEN_ALL_CORE
For information on Fall & Injury Prevention, visit: [email or web address] OR  [email or web address] OR  https://www.cdc.gov/steadi/patient.html

## 2023-01-27 NOTE — DISCHARGE NOTE PROVIDER - PROVIDER TOKENS
PROVIDER:[TOKEN:[[id number]],SCHEDULEDAPPT:[02/09/2023],SCHEDULEDAPPTTIME:[10:00 AM],ESTABLISHEDPATIENT:[T]]

## 2023-01-27 NOTE — CARE COORDINATION ASSESSMENT. - CURRENT MENTAL STATUS/COGNITIVE FUNCTIONING
alert/oriented to person/oriented to place/oriented to time/oriented to situation/recall memory is intact

## 2023-01-27 NOTE — CARE COORDINATION ASSESSMENT. - NSCAREPROVIDERS_GEN_ALL_CORE_FT
CARE PROVIDERS:  Administration: Ching Eagle  Admitting: Mario Jacobo  Attending: Mario Jacobo  Case Management: Melida Higginbotham  Consultant: Ector Martin  Consultant: Michael Wisdom  Nurse: Kitty Hammer  Nurse: Annie Mclain  Nurse: Gi Musa  Nurse: Birdie Mayorga  Nurse: Radha Nassar  Nurse: Yanely Hua  Nurse: Violeta Antoine  Nurse: Lorrie Goff  Occupational Therapy: Noemí Flores  Override: Gi Musa  PCA/Nursing Assistant: Dalia Hicks  Physical Therapy: Janey Coates  Primary Team: Renetta Chairez  Primary Team: Nathan Pleitez  Primary Team: Annie Hopkins  Primary Team: Sarath Avila  Primary Team: Lalo Mc  Primary Team: Fito Hines  Research: Danie Nielsen  Respiratory Therapy: Kyra Harris  Respiratory Therapy: Laura Bourne  Team: RODRIGO  Hospitalists, Team  UR// Supp. Assoc.: Hailey De Souza

## 2023-01-27 NOTE — DISCHARGE NOTE PROVIDER - NSDCCPGOAL_GEN_ALL_CORE_FT
Subjective   Han Garcia is a 59 y.o. male.     History of Present Illness   Patient presents to office for recheck of blood pressure. At last visit, medication was changed from irbesartan to lisinopril/hctz.  Patient has been taking medication as prescribed. Reports feeling well and denies medication side effects.  BP today is 130/78.    The following portions of the patient's history were reviewed and updated as appropriate: allergies, current medications, past family history, past medical history, past social history, past surgical history and problem list.    Review of Systems   Constitutional: Negative for fatigue.   Respiratory: Negative for cough and shortness of breath.    Cardiovascular: Negative for chest pain and palpitations.   Skin: Negative for rash.   Psychiatric/Behavioral: Negative for dysphoric mood and sleep disturbance. The patient is not nervous/anxious.        Objective   Physical Exam   Constitutional: He is oriented to person, place, and time. He appears well-developed and well-nourished.   Cardiovascular: Normal rate and regular rhythm.    Pulmonary/Chest: Effort normal and breath sounds normal.   Neurological: He is oriented to person, place, and time.   Skin: Skin is warm and dry.   Psychiatric: He has a normal mood and affect. His behavior is normal. Judgment and thought content normal.   Nursing note and vitals reviewed.      Assessment/Plan   Han was seen today for hypertension.    Diagnoses and all orders for this visit:    Essential hypertension  -     lisinopril-hydrochlorothiazide (ZESTORETIC) 20-12.5 MG per tablet; Take 1 tablet by mouth Daily.                To get better and follow your care plan as instructed. Improve ambulation, ADLs and quality of life.

## 2023-01-28 ENCOUNTER — RESULT REVIEW (OUTPATIENT)
Age: 63
End: 2023-01-28

## 2023-01-28 VITALS
SYSTOLIC BLOOD PRESSURE: 139 MMHG | DIASTOLIC BLOOD PRESSURE: 85 MMHG | OXYGEN SATURATION: 98 % | HEART RATE: 65 BPM | RESPIRATION RATE: 18 BRPM | TEMPERATURE: 98 F

## 2023-01-28 LAB
ANION GAP SERPL CALC-SCNC: 9 MMOL/L — SIGNIFICANT CHANGE UP (ref 5–17)
BUN SERPL-MCNC: 17 MG/DL — SIGNIFICANT CHANGE UP (ref 7–23)
CALCIUM SERPL-MCNC: 8.4 MG/DL — SIGNIFICANT CHANGE UP (ref 8.4–10.5)
CHLORIDE SERPL-SCNC: 106 MMOL/L — SIGNIFICANT CHANGE UP (ref 96–108)
CO2 SERPL-SCNC: 28 MMOL/L — SIGNIFICANT CHANGE UP (ref 22–31)
CREAT SERPL-MCNC: 1.11 MG/DL — SIGNIFICANT CHANGE UP (ref 0.5–1.3)
EGFR: 75 ML/MIN/1.73M2 — SIGNIFICANT CHANGE UP
GLUCOSE SERPL-MCNC: 110 MG/DL — HIGH (ref 70–99)
HCT VFR BLD CALC: 32 % — LOW (ref 39–50)
HGB BLD-MCNC: 11.1 G/DL — LOW (ref 13–17)
MCHC RBC-ENTMCNC: 29.8 PG — SIGNIFICANT CHANGE UP (ref 27–34)
MCHC RBC-ENTMCNC: 34.7 GM/DL — SIGNIFICANT CHANGE UP (ref 32–36)
MCV RBC AUTO: 85.8 FL — SIGNIFICANT CHANGE UP (ref 80–100)
NRBC # BLD: 0 /100 WBCS — SIGNIFICANT CHANGE UP (ref 0–0)
PLATELET # BLD AUTO: 167 K/UL — SIGNIFICANT CHANGE UP (ref 150–400)
POTASSIUM SERPL-MCNC: 4.2 MMOL/L — SIGNIFICANT CHANGE UP (ref 3.5–5.3)
POTASSIUM SERPL-SCNC: 4.2 MMOL/L — SIGNIFICANT CHANGE UP (ref 3.5–5.3)
RBC # BLD: 3.73 M/UL — LOW (ref 4.2–5.8)
RBC # FLD: 12.4 % — SIGNIFICANT CHANGE UP (ref 10.3–14.5)
SODIUM SERPL-SCNC: 143 MMOL/L — SIGNIFICANT CHANGE UP (ref 135–145)
WBC # BLD: 8.9 K/UL — SIGNIFICANT CHANGE UP (ref 3.8–10.5)
WBC # FLD AUTO: 8.9 K/UL — SIGNIFICANT CHANGE UP (ref 3.8–10.5)

## 2023-01-28 PROCEDURE — C1776: CPT

## 2023-01-28 PROCEDURE — 97530 THERAPEUTIC ACTIVITIES: CPT

## 2023-01-28 PROCEDURE — C1713: CPT

## 2023-01-28 PROCEDURE — 73560 X-RAY EXAM OF KNEE 1 OR 2: CPT

## 2023-01-28 PROCEDURE — 97161 PT EVAL LOW COMPLEX 20 MIN: CPT

## 2023-01-28 PROCEDURE — 97110 THERAPEUTIC EXERCISES: CPT

## 2023-01-28 PROCEDURE — 97535 SELF CARE MNGMENT TRAINING: CPT

## 2023-01-28 PROCEDURE — 85027 COMPLETE CBC AUTOMATED: CPT

## 2023-01-28 PROCEDURE — 93971 EXTREMITY STUDY: CPT | Mod: 26,LT

## 2023-01-28 PROCEDURE — 97165 OT EVAL LOW COMPLEX 30 MIN: CPT

## 2023-01-28 PROCEDURE — 80048 BASIC METABOLIC PNL TOTAL CA: CPT

## 2023-01-28 PROCEDURE — 93971 EXTREMITY STUDY: CPT

## 2023-01-28 PROCEDURE — 27447 TOTAL KNEE ARTHROPLASTY: CPT | Mod: LT

## 2023-01-28 PROCEDURE — 97116 GAIT TRAINING THERAPY: CPT

## 2023-01-28 PROCEDURE — 36415 COLL VENOUS BLD VENIPUNCTURE: CPT

## 2023-01-28 RX ORDER — OXYCODONE HYDROCHLORIDE 5 MG/1
1 TABLET ORAL
Qty: 42 | Refills: 0
Start: 2023-01-28 | End: 2023-02-03

## 2023-01-28 RX ADMIN — SODIUM CHLORIDE 125 MILLILITER(S): 9 INJECTION, SOLUTION INTRAVENOUS at 00:42

## 2023-01-28 RX ADMIN — Medication 1000 MILLIGRAM(S): at 05:29

## 2023-01-28 RX ADMIN — CELECOXIB 200 MILLIGRAM(S): 200 CAPSULE ORAL at 08:55

## 2023-01-28 RX ADMIN — Medication 1000 MILLIGRAM(S): at 14:47

## 2023-01-28 RX ADMIN — Medication 1000 MILLIGRAM(S): at 15:19

## 2023-01-28 RX ADMIN — OXYCODONE HYDROCHLORIDE 5 MILLIGRAM(S): 5 TABLET ORAL at 05:31

## 2023-01-28 RX ADMIN — Medication 1000 MILLIGRAM(S): at 05:33

## 2023-01-28 RX ADMIN — Medication 200 MILLIGRAM(S): at 11:40

## 2023-01-28 RX ADMIN — SODIUM CHLORIDE 125 MILLILITER(S): 9 INJECTION, SOLUTION INTRAVENOUS at 04:17

## 2023-01-28 RX ADMIN — CELECOXIB 200 MILLIGRAM(S): 200 CAPSULE ORAL at 09:04

## 2023-01-28 RX ADMIN — OXYCODONE HYDROCHLORIDE 5 MILLIGRAM(S): 5 TABLET ORAL at 12:40

## 2023-01-28 RX ADMIN — PANTOPRAZOLE SODIUM 40 MILLIGRAM(S): 20 TABLET, DELAYED RELEASE ORAL at 05:31

## 2023-01-28 RX ADMIN — OXYCODONE HYDROCHLORIDE 5 MILLIGRAM(S): 5 TABLET ORAL at 11:40

## 2023-01-28 RX ADMIN — Medication 100 MILLIGRAM(S): at 00:42

## 2023-01-28 RX ADMIN — Medication 81 MILLIGRAM(S): at 05:29

## 2023-01-28 RX ADMIN — Medication 101.6 MILLIGRAM(S): at 05:28

## 2023-01-28 RX ADMIN — OXYCODONE HYDROCHLORIDE 5 MILLIGRAM(S): 5 TABLET ORAL at 06:29

## 2023-01-28 NOTE — PROGRESS NOTE ADULT - SUBJECTIVE AND OBJECTIVE BOX
Ortho PA Addendum    LEft LE Venous Doppler perform for LEft calf pain and equical tenderness on exam earlier is Neg for DVT. Read by Radiology.  Plan: Cont,. with home D/C plan.
Orthopaedic Post Op Note    Procedure: Left TKR  Surgeon: Narendra Jacobo     62y Male comfortable, without complaints. Up to side of bed with PT. Tolerating diet. +Voiding.  Reported pain score = 3  Denies N/V, CP, SOB, numbness/tingling of extremities.    PE:  Vital Signs Last 24 Hrs  T(C): 36.6 (27 Jan 2023 13:20), Max: 36.7 (27 Jan 2023 07:06)  T(F): 97.9 (27 Jan 2023 13:20), Max: 98.1 (27 Jan 2023 07:06)  HR: 61 (27 Jan 2023 13:20) (51 - 67)  BP: 114/77 (27 Jan 2023 13:20) (110/69 - 164/94)  RR: 19 (27 Jan 2023 13:20) (13 - 19)  SpO2: 98% (27 Jan 2023 13:20) (98% - 100%)    Parameters below as of 27 Jan 2023 13:20  Patient On (Oxygen Delivery Method): room air      General: Pt alert and oriented   Left Knee Dressing: C/D/I, Cryo/cuff in place, RADHA battery on   Bilateral LEs:  Motor:   5/5 dorsiflexion, plantarflexion, EHL  Sensation intact to LT  2+ DP Pulses  SCDs in place      A/P: 62y Male POD#0 s/p Left TKR  - Stable  - Acetaminophen, Celebrex, Oxycodone, Dilaudid for Pain Control   - DVT ppx: Aspirin 81mg q 12 h   - [place] op IV abx: Ancef  - PT, OT per protocol  - F/U AM Labs  - Instructed RADHA use/care/removal  DCP = home tomorrow pending PT, OT, medical clearance       
Discharge medication calendar:  ASA EC 81mg q12h x 4 weeks  Omeprazole 20mg QAM x 4 weeks  APAP 1000mg q8h x 2-3 weeks  Narcotic PRN  Docusate 100mg TID while taking narcotic  Miralax, Senna, or Bisacodyl PRN for treatment of constipation  Celecoxib 200mg q12h x 2-3 weeks  
Patient is a 62y old  Male who presents with a chief complaint of left knee pain--for left TKA (28 Jan 2023 10:05)      SUBJECTIVE / OVERNIGHT EVENTS: pt seen and examined. no fever, no shob, NAD, no c/o pain        Vital Signs Last 24 Hrs  T(C): 36.7 (28 Jan 2023 07:55), Max: 36.7 (27 Jan 2023 13:40)  T(F): 98.1 (28 Jan 2023 07:55), Max: 98.1 (27 Jan 2023 13:40)  HR: 65 (28 Jan 2023 07:55) (51 - 67)  BP: 139/87 (28 Jan 2023 07:55) (110/69 - 145/88)  BP(mean): --  RR: 20 (28 Jan 2023 07:55) (13 - 20)  SpO2: 97% (28 Jan 2023 07:55) (97% - 100%)    Parameters below as of 28 Jan 2023 07:55  Patient On (Oxygen Delivery Method): room air      I&O's Summary    27 Jan 2023 07:01  -  28 Jan 2023 07:00  --------------------------------------------------------  IN: 1340 mL / OUT: 1055 mL / NET: 285 mL        PHYSICAL EXAM:  GENERAL: NAD  HEAD:  Atraumatic, Normocephalic  NECK: Supple  CHEST/LUNG: CTABL  HEART: S1+S2  ABDOMEN: Soft, Nontender, Nondistended; Bowel sounds present  Neuro: no focal deficit  EXTREMITIES:  No edema  SKIN: No rashes or lesions    LABS:                        11.1   8.90  )-----------( 167      ( 28 Jan 2023 06:46 )             32.0     01-28    143  |  106  |  17  ----------------------------<  110<H>  4.2   |  28  |  1.11    Ca    8.4      28 Jan 2023 06:46        CAPILLARY BLOOD GLUCOSE                RADIOLOGY & ADDITIONAL TESTS:    Imaging Personally Reviewed:  [x] YES  [ ] NO    Consultant(s) Notes Reviewed:  [x] YES  [ ] NO      MEDICATIONS  (STANDING):  acetaminophen     Tablet .. 1000 milliGRAM(s) Oral every 8 hours  allopurinol 200 milliGRAM(s) Oral daily  aspirin enteric coated 81 milliGRAM(s) Oral every 12 hours  celecoxib 200 milliGRAM(s) Oral every 12 hours  lactated ringers. 1000 milliLiter(s) (125 mL/Hr) IV Continuous <Continuous>  pantoprazole    Tablet 40 milliGRAM(s) Oral before breakfast  polyethylene glycol 3350 17 Gram(s) Oral at bedtime  senna 2 Tablet(s) Oral at bedtime    MEDICATIONS  (PRN):  HYDROmorphone  Injectable 0.5 milliGRAM(s) IV Push every 3 hours PRN breakthrough pain  magnesium hydroxide Suspension 30 milliLiter(s) Oral daily PRN Constipation  ondansetron Injectable 4 milliGRAM(s) IV Push every 6 hours PRN Nausea and/or Vomiting  oxyCODONE    IR 5 milliGRAM(s) Oral every 3 hours PRN Moderate Pain (4 - 6)  oxyCODONE    IR 10 milliGRAM(s) Oral every 3 hours PRN Severe Pain (7 - 10)      Care Discussed with Consultants/Other Providers [x] YES  [ ] NO    HEALTH ISSUES - PROBLEM Dx:      
Procedure: Left TKR  POD #: 1  S: Pt without complaints. No SOB,CP, N/V. Tolerated Diet well.   Pain comfortable on tylenol and celebrex.  oxy 5 used twice  No BM yet, + flatus, No abdominal pain.  Pain Rx:  acetaminophen     Tablet .. 1000 milliGRAM(s) Oral every 8 hours  celecoxib 200 milliGRAM(s) Oral every 12 hours  HYDROmorphone  Injectable 0.5 milliGRAM(s) IV Push every 3 hours PRN  ondansetron Injectable 4 milliGRAM(s) IV Push every 6 hours PRN  oxyCODONE    IR 5 milliGRAM(s) Oral every 3 hours PRN  oxyCODONE    IR 10 milliGRAM(s) Oral every 3 hours PRN    O: General: On exam, No Apparent Distress  Vital Signs Last 24 Hrs  T(C): 36.7 (28 Jan 2023 07:55), Max: 36.7 (27 Jan 2023 13:40)  T(F): 98.1 (28 Jan 2023 07:55), Max: 98.1 (27 Jan 2023 13:40)  HR: 65 (28 Jan 2023 07:55) (51 - 67)  BP: 139/87 (28 Jan 2023 07:55) (110/69 - 145/88)  BP(mean): --  RR: 20 (28 Jan 2023 07:55) (13 - 20)  SpO2: 97% (28 Jan 2023 07:55) (97% - 100%)    Parameters below as of 28 Jan 2023 07:55  Patient On (Oxygen Delivery Method): room air        Lungs: BS clear bilat.  Heart: RRR no murmur  Abdomen: + BS, soft , benign exam     Ext -- left knee sarahi intact and functioning  ROM: 0-80 sitting on edge of bed.  Neurologic:  Has sensation over feet & toes bilat. Full AROM bilat feet & toes. EHL/AT = 5/5  Vascular: Feet toes warm, pink. DP = 2+ No calf tenderness bilat..  VTEP: On Bilat. Venodynes + aspirin enteric coated 81 milliGRAM(s) Oral every 12 hours    I&O's Detail    27 Jan 2023 07:01  -  28 Jan 2023 07:00  --------------------------------------------------------  IN:    Lactated Ringers: 1100 mL    Oral Fluid: 240 mL  Total IN: 1340 mL    OUT:    Blood Loss (mL): 5 mL    Voided (mL): 1050 mL  Total OUT: 1055 mL    Total NET: 285 mL          Activity in PT yesterday: Walked 100'   Labs yesterday noted.  Hospitalist input noted.  Labs Today:                         11.1   8.90  )-----------( 167      ( 28 Jan 2023 06:46 )             32.0       01-28    143  |  106  |  17  ----------------------------<  110<H>  4.2   |  28  |  1.11    Ca    8.4      28 Jan 2023 06:46        Primary Orthopedic Assessment:  • Stable from Orthopedic perspective  • Neuro motor exam stable  • Labs: CBC / Chem stable      Plan:   • Continue:  PT/OT/WBAT with assistance of a walker/Ice to knee/ Knee ROM         Incentive spirometry encouraged   • Continue DVT prophylaxis as prescribed, including use of compression devices and ankle pumps  • Continue Pain Rx  • Plans per Medicine  • Discharge planning – anticipated discharge is Home D/C with home care & home PT  when medically stable & cleared by PT/OT--today    Cryocuff use reviewed.    Opioid safety discussed w/ pt.  Do not keep opioid in the medicine cabinet in bathroom where others may have access to it.  Do not drive while taking opioid.  To dispose of it some pharmacies do have drop boxes as do police stations.  Do not flush or put in trash.  
Pt c/o left prox medial calf  pain. he had it prior to surg but now it's a bit worse.  on exam he is tender to palp prox-medial left calf.  no swelling or erythema. No cords present.  will order doppler to r/o dvt

## 2023-01-28 NOTE — PROGRESS NOTE ADULT - ASSESSMENT
63 y/o f with left knee pain and OA , gout here for left knee pain    post op care left tkr  - pain ocntrol as per primary team  - tylenol prn for fever  - AC as per primary  - incentive spirometry  - pt/ot eval    gout  - home meds    dvt/gi ppx  please obtain base line labs

## 2023-01-28 NOTE — CASE MANAGEMENT PROGRESS NOTE - NSCMPROGRESSNOTE_GEN_ALL_CORE
Pt was cleared medically for transition home with [facility] with SOC on 1/29/23. The pt was able to verbalize the transition plan. The pt has a RW in the home. The pt's daughter will transport him home and his girlfriend will help him to recover in the home. The bedside RN is aware of the plan.

## 2023-02-09 ENCOUNTER — APPOINTMENT (OUTPATIENT)
Dept: ORTHOPEDIC SURGERY | Facility: CLINIC | Age: 63
End: 2023-02-09
Payer: MEDICARE

## 2023-02-09 VITALS — HEIGHT: 70 IN | BODY MASS INDEX: 28.63 KG/M2 | WEIGHT: 200 LBS

## 2023-02-09 PROCEDURE — 99024 POSTOP FOLLOW-UP VISIT: CPT

## 2023-02-09 PROCEDURE — 73562 X-RAY EXAM OF KNEE 3: CPT | Mod: LT

## 2023-02-09 RX ORDER — CYCLOBENZAPRINE HYDROCHLORIDE 10 MG/1
10 TABLET, FILM COATED ORAL
Qty: 30 | Refills: 0 | Status: ACTIVE | COMMUNITY
Start: 2023-02-09 | End: 1900-01-01

## 2023-02-09 RX ORDER — OXYCODONE 5 MG/1
5 TABLET ORAL
Qty: 40 | Refills: 0 | Status: ACTIVE | COMMUNITY
Start: 2023-02-09 | End: 1900-01-01

## 2023-02-09 NOTE — ASSESSMENT
[FreeTextEntry1] : S/P LT TKA 1/27/23: NO F/C/S. DOING WELL. XRAYS REVIEWED WITH COMPONENTS WELL FIXED. NO SIGNS OF INFECTION. GOOD LIGAMENT BALANCE ON EXAM.  DISCUSSED THE IMPORTANCE OF ELEVATION TO REDUCE SWELLING. PROPER ELEVATION TECHNIQUES DISCUSSED. ABX AND PRECAUTIONS REVIEWED. PT RX. QUESTIONS ANSWERED. \par \par WILL ORDER STAT VENOUS DOPPLER LLE DUE TO SWELLING POST OP AND CALF PAIN, R/O DVT.

## 2023-02-09 NOTE — PHYSICAL EXAM
[Left] : left knee [] : ambulation with cane [FreeTextEntry3] : LLE SWELLING [TWNoteComboBox7] : flexion 90 degrees [de-identified] : extension 0 degrees

## 2023-02-09 NOTE — IMAGING
[Left] : left knee [AP] : anteroposterior [Lateral] : lateral [[place]] : skyline [Components well fixed, in good position] : Components well fixed, in good position

## 2023-02-09 NOTE — HISTORY OF PRESENT ILLNESS
[Gradual] : gradual [8] : 8 [7] : 7 [Localized] : localized [Tightness] : tightness [Intermittent] : intermittent [Household chores] : household chores [Rest] : rest [Standing] : standing [Walking] : walking [] : yes [de-identified] : 02/09/2023 PT PRESENTS HERE TODAY FOR A FOLLOW UP S/P LEFT TKA DONE 01/27/2023 BY DR MALIK  [FreeTextEntry1] : LEFT KNEE  [de-identified] : SX

## 2023-03-01 RX ORDER — AMOXICILLIN 500 MG/1
500 TABLET, FILM COATED ORAL
Qty: 20 | Refills: 0 | Status: ACTIVE | COMMUNITY
Start: 2023-03-01 | End: 1900-01-01

## 2023-03-20 NOTE — H&P PST ADULT - ALCOHOL USE HISTORY SINGLE SELECT
[facility]  117/01  HOSPITAL MEDICINE PROGRESS NOTE   Patient: Isidro Kaur  Today's Date: 3/20/2023    YOB: 1986  Admission Date: 3/17/2023    MRN: [id number]  Inpatient LOS: 3    Attending: Santiago León MD  Hospital Day: Hospital Day: 4    Subjective   HISTORY AND SUBJECTIVE COMPLAINTS     Chief Complaint:   Shortness of breath    Interval History / Subjective:   Patient states that he feels fine and denies any chest pain, SOB or dizziness. Noted to have desaturation while sleeping requiring 8 L oxygen. Discussed with pulmonary and RN.Will teach lovenox injection. INR 1.1. Hematology consulted.    Hospital Course:  Isidro Kaur is a 37 year old male with no significant past medical history who presented on 3/17/2023 with complaints of shortness of breath. He was found to be hypoxic to 80s. D-dimer was noted to be 5.99 and CT PE revealed bilateral PE with RV strain (obvious/LV ratio of 1.4) with associated elevated troponin and NT proBNP. Patient admitted to to the ICU for management of acute hypoxic respiratory failure secondary to acute bilateral pulmonary embolism with  cor pulmonale. Patient underwent thrombectomy and catheter based thrombolysis.    ROS:  Pertinent systems negative except as above.    Objective   PHYSICAL EXAMINATION     Vital 24 Hour Range Most Recent Value   Temperature Temp  Min: 96.5 °F (35.8 °C)  Max: 98.3 °F (36.8 °C) 98 °F (36.7 °C)   Pulse Pulse  Min: 82  Max: 88 82   Respiratory Resp  Min: 18  Max: 20 18   Blood Pressure BP  Min: 132/61  Max: 144/87 (!) 144/87   Pulse Oximetry SpO2  Min: 93 %  Max: 98 % 95 %   Arterial BP No data recorded     O2 O2 Flow Rate (L/min)  Av L/min  Min: 8 L/min   Min taken time: 23 0600  Max: 8 L/min   Max taken time: 23 0600       Recorded Intake and Output:    Intake/Output Summary (Last 24 hours) at 3/20/2023 1306  Last data filed at 3/19/2023 2041  Gross per 24 hour   Intake 742 ml   Output --    Net 742 ml      Recorded Last Stool Occurrence: 1 (03/19/23 0236)     Vital Most Recent Value First Value   Weight (!) 164 kg (361 lb 8.9 oz) Weight: (!) 159.3 kg (351 lb 3.1 oz)   Height 5' 11\" (180.3 cm) Height: 5' 11\" (180.3 cm)   BMI 50.43 N/A     General: Looks well no apparent distress  CV: regular rate and rhythm  Resp: clear to auscultation bilaterally  Abd: soft, nontender, nondistended and bowel sounds  present  Ext: edema absent  and dorsalis pedis pulses equal bilaterally   Skin: no rashes, lesions, or ulcers noted  Neuro: CN 2-12 grossly intact and no focal deficits noted  Psych: oriented to time, place and person    TEST RESULTS     Labs: The Laboratory values listed below have been reviewed and pertinent findings discussed in the Assessment and Plan.    Laboratory values:   Recent Labs   Lab 03/20/23  0530 03/19/23  0317 03/18/23  1310   WBC 10.6 11.0 12.1*   HGB 13.1 13.4 14.4   HCT 39.0 39.2 42.5    141 147       Recent Labs   Lab 03/20/23  0530 03/19/23  0317 03/18/23  1310 03/18/23  0523 03/17/23  1540   SODIUM 139 138  --  139 135   POTASSIUM 3.7 3.6 4.3 3.5 4.0   CHLORIDE 104 106  --  106 99   CO2 24 23  --  24 24   CALCIUM 8.8 8.1*  --  8.3* 9.4   GLUCOSE 132* 137*  --  186* 217*   BUN 11 12  --  14 15   CREATININE 0.96 0.97  --  0.95 1.16   MG  --   --   --  2.0 2.0        Recent Labs   Lab 03/20/23  0530 03/19/23  0317 03/18/23  0523   ALBUMIN 3.0* 2.9* 2.8*   AST 54* 44* 67*   GPT 86* 80* 90*   BILIRUBIN 0.6 0.7 0.9     Recent Labs   Lab 03/17/23  1801 03/17/23  1540   PCT  --  0.10*   LACTA 1.2 2.1*     No results available in last 24 hours      Radiology: Imaging studies have been reviewed and pertinent findings discussed in the Assessment and Plan.  No results found for any visits on 03/17/23 (from the past 48 hour(s)).  Echo 3/18    Normal LV size, mildly increased wall thickness, normal systolic function, EF 60%.  Grade I diastolic dysfunction of the left ventricle (impaired  relaxation pattern).  Mildly dilated RV, normal systolic function.  Catheter visualized in right atrium/right ventricle/RVOT.  Normal left atrial chamber size.  Mildly increased right atrial chamber size.  Mild tricuspid valve regurgitation.  Right ventricular systolic pressure; 49 mmHg.  Mildly dilated aortic root 4 cm, ascending aorta 3.5.  No pericardial effusion.  Dilated IVC with decreased respiratory variation.     ANCILLARY ORDERS     Diet:  One Time Diet Regular  Regular Diet  Telemetry: On  Consults:    IP CONSULT TO PULMONOLOGY  IP CONSULT TO CARDIOLOGY  PHARMACY TO DOSE AND MONITOR WARFARIN  IP CONSULT TO HEMATOLOGY  Therapy Orders:   PT and OT Orders Placed this Encounter   Procedures   • Occupational Therapy   • Physical Therapy       ADVANCED DIRECTIVES     Code Status: Full Resuscitation             ASSESSMENT AND PLAN     Acute hypoxic respiratory failure secondary to acute bilateral pulmonary embolism with cor pulmonale.  Status post thrombectomy and catheter based thrombolysis  Pulmonary and IR seen and evaluated   Treated with tPA and heparin through catheter per IR protocol  Doppler US BL LE no acute DVT  Echo see above  Unprovoked but his brother has prothrombin gene mutation and is on anticoagulation per patient  Will monitor VS and pulse ox   Will follow up with hematology consult   Discussed with phramacy and pulmonary.   Transited to coumadin with Lovenox bridge.     Elevated trop  Likely secondary to acute PE  Patient denies chest pain  Trop peaked 2374  Cardiology seen and evaluated   Echo normal EF, mildly dilated RV with normal function, estimated RV systolic pressure 49, mildly dilated aortic root 4 cm  Monitor patient on tele    Leucocytosis  Likely reactive  No clear source of infection   Will monitor CBC and VS  Improved     Likely undiagnosed DANIEL  Will need out patient work up  Patient needs 8 L oxygen via NC while sleeping     Morbid obesity  BMI 49.8  Will  wt loss and  healthy life style  Nutrition consult     Smoking status: Former Tobacco User    Nutrition status: Morbidly obese  Body mass index is 50.43 kg/m². - Patient is morbidly obese with BMI >40  DVT Prophylaxis: Heparin drip per protocol         DISCHARGE PLANNING     The patient's treatment plans were discussed with patient, RN, consultant(s) and intensivist.    Discharge Planning     Barriers to discharge: Patient is not medically ready and needs to remain in the hospital today due to acute respiratory failure with hypoxia   Anticipated discharge destination: TBD  Expected Discharge Date: 3/20/2023                Santiago León MD  Hospitalist  3/20/2023  1:06 PM     yes...

## 2023-04-05 PROBLEM — Z92.29 PERSONAL HISTORY OF OTHER DRUG THERAPY: Chronic | Status: ACTIVE | Noted: 2023-01-09

## 2023-04-05 PROBLEM — M10.9 GOUT, UNSPECIFIED: Chronic | Status: ACTIVE | Noted: 2023-01-09

## 2023-04-05 PROBLEM — M17.12 UNILATERAL PRIMARY OSTEOARTHRITIS, LEFT KNEE: Chronic | Status: ACTIVE | Noted: 2023-01-09

## 2023-04-13 NOTE — DISCHARGE NOTE NURSING/CASE MANAGEMENT/SOCIAL WORK - NURSING SECTION COMPLETE
Problem: Diabetes/Glucose Control  Goal: Glucose maintained within prescribed range  Description: INTERVENTIONS:  - Monitor Blood Glucose as ordered  - Assess for signs and symptoms of hyperglycemia and hypoglycemia  - Administer ordered medications to maintain glucose within target range  - Assess barriers to adequate nutritional intake and initiate nutrition consult as needed  - Instruct patient on self management of diabetes  Outcome: Progressing     Problem: PAIN - ADULT  Goal: Verbalizes/displays adequate comfort level or patient's stated pain goal  Description: INTERVENTIONS:  - Encourage pt to monitor pain and request assistance  - Assess pain using appropriate pain scale  - Administer analgesics based on type and severity of pain and evaluate response  - Implement non-pharmacological measures as appropriate and evaluate response  - Consider cultural and social influences on pain and pain management  - Manage/alleviate anxiety  - Utilize distraction and/or relaxation techniques  - Monitor for opioid side effects  - Notify MD/LIP if interventions unsuccessful or patient reports new pain  - Anticipate increased pain with activity and pre-medicate as appropriate  Outcome: Progressing     Problem: GASTROINTESTINAL - ADULT  Goal: Minimal or absence of nausea and vomiting  Description: INTERVENTIONS:  - Maintain adequate hydration with IV or PO as ordered and tolerated  - Nasogastric tube to low intermittent suction as ordered  - Evaluate effectiveness of ordered antiemetic medications  - Provide nonpharmacologic comfort measures as appropriate  - Advance diet as tolerated, if ordered  - Obtain nutritional consult as needed  - Evaluate fluid balance  Outcome: Progressing  Goal: Maintains or returns to baseline bowel function  Description: INTERVENTIONS:  - Assess bowel function  - Maintain adequate hydration with IV or PO as ordered and tolerated  - Evaluate effectiveness of GI medications  - Encourage mobilization and activity  - Obtain nutritional consult as needed  - Establish a toileting routine/schedule  - Consider collaborating with pharmacy to review patient's medication profile  Outcome: Progressing     Problem: HEMATOLOGIC - ADULT  Goal: Free from bleeding injury  Description: (Example usage: patient with low platelets)  INTERVENTIONS:  - Avoid intramuscular injections, enemas and rectal medication administration  - Ensure safe mobilization of patient  - Hold pressure on venipuncture sites to achieve adequate hemostasis  - Assess for signs and symptoms of internal bleeding  - Monitor lab trends  - Patient is to report abnormal signs of bleeding to staff  - Avoid use of toothpicks and dental floss  - Use electric shaver for shaving  - Use soft bristle tooth brush  - Limit straining and forceful nose blowing  Outcome: Progressing     Problem: Patient/Family Goals  Goal: Patient/Family Long Term Goal  Description: Patient's Long Term Goal: Discharge    Interventions:  - TPN  -Ambulate 4X/day  - See additional Care Plan goals for specific interventions  Outcome: Progressing  Goal: Patient/Family Short Term Goal  Description: Patient's Short Term Goal: Comfort    Interventions:   - Pain meds as needed  - See additional Care Plan goals for specific interventions  Outcome: Progressing     Problem: SAFETY ADULT - FALL  Goal: Free from fall injury  Description: INTERVENTIONS:  - Assess pt frequently for physical needs  - Identify cognitive and physical deficits and behaviors that affect risk of falls.   - [place] fall precautions as indicated by assessment.  - Educate pt/family on patient safety including physical limitations  - Instruct pt to call for assistance with activity based on assessment  - Modify environment to reduce risk of injury  - Provide assistive devices as appropriate  - Consider OT/PT consult to assist with strengthening/mobility  - Encourage toileting schedule  Outcome: Progressing Problem: PAIN - ADULT  Goal: Verbalizes/displays adequate comfort level or patient's stated pain goal  Description: INTERVENTIONS:  - Encourage pt to monitor pain and request assistance  - Assess pain using appropriate pain scale  - Administer analgesics based on type and severity of pain and evaluate response  - Implement non-pharmacological measures as appropriate and evaluate response  - Consider cultural and social influences on pain and pain management  - Manage/alleviate anxiety  - Utilize distraction and/or relaxation techniques  - Monitor for opioid side effects  - Notify MD/LIP if interventions unsuccessful or patient reports new pain  - Anticipate increased pain with activity and pre-medicate as appropriate  Outcome: Progressing     Problem: GASTROINTESTINAL - ADULT  Goal: Minimal or absence of nausea and vomiting  Description: INTERVENTIONS:  - Maintain adequate hydration with IV or PO as ordered and tolerated  - Nasogastric tube to low intermittent suction as ordered  - Evaluate effectiveness of ordered antiemetic medications  - Provide nonpharmacologic comfort measures as appropriate  - Advance diet as tolerated, if ordered  - Obtain nutritional consult as needed  - Evaluate fluid balance  Outcome: Progressing     Problem: HEMATOLOGIC - ADULT  Goal: Free from bleeding injury  Description: (Example usage: patient with low platelets)  INTERVENTIONS:  - Avoid intramuscular injections, enemas and rectal medication administration  - Ensure safe mobilization of patient  - Hold pressure on venipuncture sites to achieve adequate hemostasis  - Assess for signs and symptoms of internal bleeding  - Monitor lab trends  - Patient is to report abnormal signs of bleeding to staff  - Avoid use of toothpicks and dental floss  - Use electric shaver for shaving  - Use soft bristle tooth brush  - Limit straining and forceful nose blowing  Outcome: Progressing     Problem: SAFETY ADULT - FALL  Goal: Free from fall injury  Description: INTERVENTIONS:  - Assess pt frequently for physical needs  - Identify cognitive and physical deficits and behaviors that affect risk of falls.   - [place] fall precautions as indicated by assessment.  - Educate pt/family on patient safety including physical limitations  - Instruct pt to call for assistance with activity based on assessment  - Modify environment to reduce risk of injury  - Provide assistive devices as appropriate  - Consider OT/PT consult to assist with strengthening/mobility  - Encourage toileting schedule  Outcome: Progressing Patient/Caregiver provided printed discharge information.

## 2023-04-27 ENCOUNTER — APPOINTMENT (OUTPATIENT)
Dept: ORTHOPEDIC SURGERY | Facility: CLINIC | Age: 63
End: 2023-04-27
Payer: COMMERCIAL

## 2023-04-27 VITALS — BODY MASS INDEX: 28.63 KG/M2 | HEIGHT: 70 IN | WEIGHT: 200 LBS

## 2023-04-27 DIAGNOSIS — Z96.652 PRESENCE OF LEFT ARTIFICIAL KNEE JOINT: ICD-10-CM

## 2023-04-27 PROCEDURE — 99024 POSTOP FOLLOW-UP VISIT: CPT

## 2023-04-27 PROCEDURE — 73562 X-RAY EXAM OF KNEE 3: CPT | Mod: LT

## 2023-04-27 NOTE — HISTORY OF PRESENT ILLNESS
[Gradual] : gradual [4] : 4 [3] : 3 [Localized] : localized [Tightness] : tightness [Intermittent] : intermittent [Household chores] : household chores [Rest] : rest [Standing] : standing [Walking] : walking [] : yes [de-identified] : 02/09/2023 PT PRESENTS HERE TODAY FOR A FOLLOW UP S/P LEFT TKA DONE 01/27/2023 BY DR MALIK \par \par 04/27/23 FOLLOW UP S/P LEFT TKA DOING WELL  [FreeTextEntry1] : LEFT KNEE  [de-identified] : SX\par PHYSICAL THERAPY

## 2023-04-27 NOTE — IMAGING
[Left] : left knee [AP] : anteroposterior [Lateral] : lateral [[place]] : [place] [Components well fixed, in good position] : Components well fixed, in good position

## 2023-04-27 NOTE — PHYSICAL EXAM
[Left] : left knee [] : no sign of infection [FreeTextEntry3] : LLE SWELLING [TWNoteComboBox7] : flexion 90 degrees [de-identified] : extension 0 degrees

## 2023-04-27 NOTE — ASSESSMENT
[FreeTextEntry1] : S/P LT TKA 1/27/23: NO F/C/S. DOING WELL. XRAYS REVIEWED WITH COMPONENTS WELL FIXED. NO SIGNS OF INFECTION. GOOD LIGAMENT BALANCE ON EXAM.  DISCUSSED THE IMPORTANCE OF ELEVATION TO REDUCE SWELLING. PROPER ELEVATION TECHNIQUES DISCUSSED. ABX AND PRECAUTIONS REVIEWED. PT RX. QUESTIONS ANSWERED.

## 2024-05-24 RX ORDER — AMOXICILLIN 500 MG/1
500 TABLET, FILM COATED ORAL
Qty: 20 | Refills: 0 | Status: ACTIVE | COMMUNITY
Start: 2024-05-24 | End: 1900-01-01

## 2025-03-06 ENCOUNTER — APPOINTMENT (OUTPATIENT)
Dept: ORTHOPEDIC SURGERY | Facility: CLINIC | Age: 65
End: 2025-03-06

## 2025-03-17 NOTE — ASU PATIENT PROFILE, ADULT - PATIENT KNOW
Labs today, will call with results and recommendations.   Avoid ibuprofen/aleve/advil.   Continue good hydration, low sodium diet.   Check blood pressure at home, keep log.   Labs and follow up in August with Dr. Johnson.     yes

## 2025-04-10 ENCOUNTER — APPOINTMENT (OUTPATIENT)
Dept: ORTHOPEDIC SURGERY | Facility: CLINIC | Age: 65
End: 2025-04-10

## (undated) DEVICE — DRAPE 3/4 SHEET 52X76"

## (undated) DEVICE — PACK TOTAL KNEE

## (undated) DEVICE — DRSG STOCKINETTE TUBULAR COTTON 1PLY 6X72"

## (undated) DEVICE — DRAPE STERI-DRAPE INCISE 32X33"

## (undated) DEVICE — SYR LUER LOK 20CC

## (undated) DEVICE — WOUND IRR IRRISEPT W 0.5 CHG

## (undated) DEVICE — SOL IRR BAG NS 0.9% 3000ML

## (undated) DEVICE — GLV 8 PROTEXIS (BLUE)

## (undated) DEVICE — NDL HYPO SAFE 20G X 1.5" (YELLOW)

## (undated) DEVICE — HOOD T5 PEELAWAY

## (undated) DEVICE — SUT VICRYL 1 36" CTX UNDYED

## (undated) DEVICE — DRAPE MAYO STAND 30"

## (undated) DEVICE — CRYO/CUFF GRAVITY COOLER KNEE LARGE

## (undated) DEVICE — HOOD FLYTE STRYKER HELMET SHIELD

## (undated) DEVICE — DRSG STOCKINETTE TUBULAR COTTON 2PLY 6X72"

## (undated) DEVICE — PLASTIC SOLUTION BOWL 160Z

## (undated) DEVICE — PREP CHLORAPREP HI-LITE ORANGE 26ML

## (undated) DEVICE — STRYKER MIXEVAC 3 BONE CEMENT MIXER

## (undated) DEVICE — GOWN IMPERV BREATHABLE XL

## (undated) DEVICE — GLV 8 PROTEXIS (WHITE)

## (undated) DEVICE — DRSG WEBRIL 6"

## (undated) DEVICE — SAW BLADE STRYKER SAGITTAL 25X98.5X1.24MM

## (undated) DEVICE — SUCTION YANKAUER TAPERED BULBOUS NO VENT

## (undated) DEVICE — PACK TOTAL JOINT

## (undated) DEVICE — DRSG PICO NPWT 4X12"

## (undated) DEVICE — SUT VLOC 90 4-0 18" P-12 UNDYED

## (undated) DEVICE — ZIMMER PULSAVAC PLUS FAN KIT

## (undated) DEVICE — DRAPE 1/2 SHEET 40X57"

## (undated) DEVICE — SYM-STRYKER SYSTEM 7: Type: DURABLE MEDICAL EQUIPMENT

## (undated) DEVICE — SAW BLADE STRYKER SAGITTAL 3 HOLE OSCILLATING

## (undated) DEVICE — SUT VICRYL 2-0 36" CT-1 UNDYED

## (undated) DEVICE — WARMING BLANKET UPPER ADULT